# Patient Record
Sex: FEMALE | Race: BLACK OR AFRICAN AMERICAN | NOT HISPANIC OR LATINO
[De-identification: names, ages, dates, MRNs, and addresses within clinical notes are randomized per-mention and may not be internally consistent; named-entity substitution may affect disease eponyms.]

---

## 2019-06-10 ENCOUNTER — RECORD ABSTRACTING (OUTPATIENT)
Age: 44
End: 2019-06-10

## 2019-06-10 DIAGNOSIS — Z78.9 OTHER SPECIFIED HEALTH STATUS: ICD-10-CM

## 2019-06-10 DIAGNOSIS — Z83.3 FAMILY HISTORY OF DIABETES MELLITUS: ICD-10-CM

## 2019-06-12 ENCOUNTER — APPOINTMENT (OUTPATIENT)
Dept: FAMILY MEDICINE | Facility: CLINIC | Age: 44
End: 2019-06-12
Payer: COMMERCIAL

## 2019-06-12 ENCOUNTER — TRANSCRIPTION ENCOUNTER (OUTPATIENT)
Age: 44
End: 2019-06-12

## 2019-06-12 VITALS
SYSTOLIC BLOOD PRESSURE: 130 MMHG | BODY MASS INDEX: 33.18 KG/M2 | TEMPERATURE: 97.4 F | HEART RATE: 82 BPM | DIASTOLIC BLOOD PRESSURE: 82 MMHG | HEIGHT: 71 IN | OXYGEN SATURATION: 98 % | WEIGHT: 237 LBS

## 2019-06-12 DIAGNOSIS — M25.561 PAIN IN RIGHT KNEE: ICD-10-CM

## 2019-06-12 DIAGNOSIS — M25.562 PAIN IN RIGHT KNEE: ICD-10-CM

## 2019-06-12 PROCEDURE — 36415 COLL VENOUS BLD VENIPUNCTURE: CPT

## 2019-06-12 PROCEDURE — 99214 OFFICE O/P EST MOD 30 MIN: CPT | Mod: 25

## 2019-06-12 RX ORDER — MELOXICAM 15 MG/1
15 TABLET ORAL
Refills: 0 | Status: DISCONTINUED | COMMUNITY
End: 2019-06-12

## 2019-06-12 NOTE — REVIEW OF SYSTEMS
[Joint Pain] : joint pain [Joint Stiffness] : joint stiffness [Negative] : Heme/Lymph [FreeTextEntry9] : knee pain

## 2019-06-12 NOTE — PHYSICAL EXAM

## 2019-06-13 LAB
ANION GAP SERPL CALC-SCNC: 15 MMOL/L
BUN SERPL-MCNC: 10 MG/DL
CALCIUM SERPL-MCNC: 9.4 MG/DL
CHLORIDE SERPL-SCNC: 100 MMOL/L
CO2 SERPL-SCNC: 25 MMOL/L
CREAT SERPL-MCNC: 0.95 MG/DL
ERYTHROCYTE [SEDIMENTATION RATE] IN BLOOD BY WESTERGREN METHOD: 24 MM/HR
GLUCOSE SERPL-MCNC: 126 MG/DL
POTASSIUM SERPL-SCNC: 3.7 MMOL/L
RHEUMATOID FACT SER QL: <10 IU/ML
SODIUM SERPL-SCNC: 140 MMOL/L

## 2019-08-13 ENCOUNTER — APPOINTMENT (OUTPATIENT)
Dept: FAMILY MEDICINE | Facility: CLINIC | Age: 44
End: 2019-08-13
Payer: COMMERCIAL

## 2019-08-13 VITALS
WEIGHT: 231 LBS | HEART RATE: 70 BPM | DIASTOLIC BLOOD PRESSURE: 80 MMHG | SYSTOLIC BLOOD PRESSURE: 130 MMHG | HEIGHT: 71 IN | OXYGEN SATURATION: 99 % | BODY MASS INDEX: 32.34 KG/M2 | TEMPERATURE: 97.6 F

## 2019-08-13 PROCEDURE — 36415 COLL VENOUS BLD VENIPUNCTURE: CPT

## 2019-08-13 PROCEDURE — 99213 OFFICE O/P EST LOW 20 MIN: CPT | Mod: 25

## 2019-08-13 NOTE — PHYSICAL EXAM
[No Acute Distress] : no acute distress [Well Nourished] : well nourished [Well Developed] : well developed [Well-Appearing] : well-appearing [Normal Sclera/Conjunctiva] : normal sclera/conjunctiva [EOMI] : extraocular movements intact [PERRL] : pupils equal round and reactive to light [Normal Outer Ear/Nose] : the outer ears and nose were normal in appearance [No JVD] : no jugular venous distention [Normal Oropharynx] : the oropharynx was normal [No Lymphadenopathy] : no lymphadenopathy [Supple] : supple [No Respiratory Distress] : no respiratory distress  [No Accessory Muscle Use] : no accessory muscle use [Clear to Auscultation] : lungs were clear to auscultation bilaterally [Normal Rate] : normal rate  [Regular Rhythm] : with a regular rhythm [Normal S1, S2] : normal S1 and S2 [No Murmur] : no murmur heard [No Carotid Bruits] : no carotid bruits [No Varicosities] : no varicosities [No Abdominal Bruit] : a ~M bruit was not heard ~T in the abdomen [Pedal Pulses Present] : the pedal pulses are present [No Edema] : there was no peripheral edema [No Palpable Aorta] : no palpable aorta [No Extremity Clubbing/Cyanosis] : no extremity clubbing/cyanosis [Soft] : abdomen soft [Non-distended] : non-distended [Non Tender] : non-tender [No Masses] : no abdominal mass palpated [No HSM] : no HSM [Normal Bowel Sounds] : normal bowel sounds [Normal Posterior Cervical Nodes] : no posterior cervical lymphadenopathy [Normal Anterior Cervical Nodes] : no anterior cervical lymphadenopathy [No Spinal Tenderness] : no spinal tenderness [No CVA Tenderness] : no CVA  tenderness [Grossly Normal Strength/Tone] : grossly normal strength/tone [No Joint Swelling] : no joint swelling [Coordination Grossly Intact] : coordination grossly intact [No Rash] : no rash [No Focal Deficits] : no focal deficits [Normal Gait] : normal gait [Normal Affect] : the affect was normal [Deep Tendon Reflexes (DTR)] : deep tendon reflexes were 2+ and symmetric [Normal Insight/Judgement] : insight and judgment were intact [de-identified] : Questionable thyromegaly

## 2019-08-15 LAB
ANION GAP SERPL CALC-SCNC: 11 MMOL/L
BUN SERPL-MCNC: 14 MG/DL
CALCIUM SERPL-MCNC: 9.7 MG/DL
CHLORIDE SERPL-SCNC: 100 MMOL/L
CO2 SERPL-SCNC: 27 MMOL/L
CREAT SERPL-MCNC: 1.03 MG/DL
GLUCOSE SERPL-MCNC: 84 MG/DL
POTASSIUM SERPL-SCNC: 4 MMOL/L
SODIUM SERPL-SCNC: 138 MMOL/L
T4 FREE SERPL-MCNC: 1.3 NG/DL
TSH SERPL-ACNC: 1.83 UIU/ML

## 2019-11-27 ENCOUNTER — APPOINTMENT (OUTPATIENT)
Dept: FAMILY MEDICINE | Facility: CLINIC | Age: 44
End: 2019-11-27
Payer: COMMERCIAL

## 2019-11-27 VITALS
OXYGEN SATURATION: 98 % | WEIGHT: 237 LBS | BODY MASS INDEX: 33.18 KG/M2 | HEART RATE: 91 BPM | DIASTOLIC BLOOD PRESSURE: 80 MMHG | TEMPERATURE: 99.8 F | HEIGHT: 71 IN | SYSTOLIC BLOOD PRESSURE: 124 MMHG

## 2019-11-27 DIAGNOSIS — E87.6 HYPOKALEMIA: ICD-10-CM

## 2019-11-27 DIAGNOSIS — Z87.09 PERSONAL HISTORY OF OTHER DISEASES OF THE RESPIRATORY SYSTEM: ICD-10-CM

## 2019-11-27 PROCEDURE — 99213 OFFICE O/P EST LOW 20 MIN: CPT

## 2019-11-27 RX ORDER — ALBUTEROL SULFATE 90 UG/1
108 (90 BASE) AEROSOL, METERED RESPIRATORY (INHALATION)
Qty: 1 | Refills: 1 | Status: ACTIVE | COMMUNITY
Start: 2019-11-27 | End: 1900-01-01

## 2019-11-27 NOTE — PHYSICAL EXAM
[Normal Sclera/Conjunctiva] : normal sclera/conjunctiva [Normal Oropharynx] : the oropharynx was normal [Normal] : normal rate, regular rhythm, normal S1 and S2 and no murmur heard [Normal Supraclavicular Nodes] : no supraclavicular lymphadenopathy [Normal Posterior Cervical Nodes] : no posterior cervical lymphadenopathy [Normal Anterior Cervical Nodes] : no anterior cervical lymphadenopathy

## 2019-11-27 NOTE — HISTORY OF PRESENT ILLNESS
[Cold Symptoms] : cold symptoms [Moderate] : moderate [Cough] : cough [Chills] : chills [Fatigue] : fatigue [Fever] : fever [Worsening] : worsening [FreeTextEntry8] : 43-year-old female past medical history of hypertension. Here today complaining of congestion, cough, low grade temp x2 days. Positive history of asthma

## 2020-01-06 ENCOUNTER — RX RENEWAL (OUTPATIENT)
Age: 45
End: 2020-01-06

## 2020-06-17 ENCOUNTER — APPOINTMENT (OUTPATIENT)
Dept: FAMILY MEDICINE | Facility: CLINIC | Age: 45
End: 2020-06-17
Payer: COMMERCIAL

## 2020-06-17 VITALS
BODY MASS INDEX: 32.5 KG/M2 | TEMPERATURE: 97.8 F | HEART RATE: 73 BPM | RESPIRATION RATE: 12 BRPM | WEIGHT: 233 LBS | OXYGEN SATURATION: 98 %

## 2020-06-17 VITALS — SYSTOLIC BLOOD PRESSURE: 110 MMHG | HEART RATE: 68 BPM | DIASTOLIC BLOOD PRESSURE: 80 MMHG

## 2020-06-17 LAB
BILIRUB UR QL STRIP: NORMAL
CLARITY UR: CLEAR
COLLECTION METHOD: NORMAL
GLUCOSE UR-MCNC: NORMAL
HCG UR QL: 0.2 EU/DL
HGB UR QL STRIP.AUTO: NORMAL
KETONES UR-MCNC: NORMAL
LEUKOCYTE ESTERASE UR QL STRIP: NORMAL
NITRITE UR QL STRIP: NORMAL
PH UR STRIP: 7
PROT UR STRIP-MCNC: NORMAL
SP GR UR STRIP: 1.01

## 2020-06-17 PROCEDURE — 99213 OFFICE O/P EST LOW 20 MIN: CPT

## 2020-06-17 PROCEDURE — 81003 URINALYSIS AUTO W/O SCOPE: CPT | Mod: QW

## 2020-06-17 PROCEDURE — 36415 COLL VENOUS BLD VENIPUNCTURE: CPT

## 2020-06-17 NOTE — HISTORY OF PRESENT ILLNESS
[FreeTextEntry1] : Pt is here for BW and medications renewal. [de-identified] : pt feeling well no complaints

## 2020-06-17 NOTE — PHYSICAL EXAM
[No Acute Distress] : no acute distress [Well-Appearing] : well-appearing [Well Developed] : well developed [Well Nourished] : well nourished [PERRL] : pupils equal round and reactive to light [Normal Sclera/Conjunctiva] : normal sclera/conjunctiva [Normal Oropharynx] : the oropharynx was normal [EOMI] : extraocular movements intact [No JVD] : no jugular venous distention [Normal Outer Ear/Nose] : the outer ears and nose were normal in appearance [No Lymphadenopathy] : no lymphadenopathy [Thyroid Normal, No Nodules] : the thyroid was normal and there were no nodules present [Supple] : supple [No Respiratory Distress] : no respiratory distress  [No Accessory Muscle Use] : no accessory muscle use [Clear to Auscultation] : lungs were clear to auscultation bilaterally [Normal Rate] : normal rate  [Regular Rhythm] : with a regular rhythm [Normal S1, S2] : normal S1 and S2 [No Murmur] : no murmur heard [No Abdominal Bruit] : a ~M bruit was not heard ~T in the abdomen [No Carotid Bruits] : no carotid bruits [Pedal Pulses Present] : the pedal pulses are present [No Edema] : there was no peripheral edema [No Varicosities] : no varicosities [No Palpable Aorta] : no palpable aorta [Soft] : abdomen soft [No Extremity Clubbing/Cyanosis] : no extremity clubbing/cyanosis [Non Tender] : non-tender [Non-distended] : non-distended [No HSM] : no HSM [No Masses] : no abdominal mass palpated [Normal Bowel Sounds] : normal bowel sounds [Normal Posterior Cervical Nodes] : no posterior cervical lymphadenopathy [Normal Anterior Cervical Nodes] : no anterior cervical lymphadenopathy [No CVA Tenderness] : no CVA  tenderness [No Spinal Tenderness] : no spinal tenderness [No Joint Swelling] : no joint swelling [Grossly Normal Strength/Tone] : grossly normal strength/tone [No Rash] : no rash [Normal Gait] : normal gait [Coordination Grossly Intact] : coordination grossly intact [No Focal Deficits] : no focal deficits [Normal Affect] : the affect was normal [Deep Tendon Reflexes (DTR)] : deep tendon reflexes were 2+ and symmetric [Normal Insight/Judgement] : insight and judgment were intact

## 2020-06-18 LAB
ALBUMIN SERPL ELPH-MCNC: 4.3 G/DL
ALP BLD-CCNC: 60 U/L
ALT SERPL-CCNC: 29 U/L
ANION GAP SERPL CALC-SCNC: 14 MMOL/L
AST SERPL-CCNC: 27 U/L
BASOPHILS # BLD AUTO: 0.04 K/UL
BASOPHILS NFR BLD AUTO: 0.5 %
BILIRUB SERPL-MCNC: 0.4 MG/DL
BUN SERPL-MCNC: 11 MG/DL
CALCIUM SERPL-MCNC: 9.3 MG/DL
CHLORIDE SERPL-SCNC: 96 MMOL/L
CHOLEST SERPL-MCNC: 201 MG/DL
CHOLEST/HDLC SERPL: 5.4 RATIO
CO2 SERPL-SCNC: 28 MMOL/L
CREAT SERPL-MCNC: 1.02 MG/DL
EOSINOPHIL # BLD AUTO: 0.07 K/UL
EOSINOPHIL NFR BLD AUTO: 0.9 %
ESTIMATED AVERAGE GLUCOSE: 103 MG/DL
GLUCOSE SERPL-MCNC: 94 MG/DL
HBA1C MFR BLD HPLC: 5.2 %
HCT VFR BLD CALC: 43.6 %
HDLC SERPL-MCNC: 37 MG/DL
HGB BLD-MCNC: 14.6 G/DL
IMM GRANULOCYTES NFR BLD AUTO: 0.5 %
LDLC SERPL CALC-MCNC: 111 MG/DL
LYMPHOCYTES # BLD AUTO: 2.5 K/UL
LYMPHOCYTES NFR BLD AUTO: 30.9 %
MAN DIFF?: NORMAL
MCHC RBC-ENTMCNC: 32.6 PG
MCHC RBC-ENTMCNC: 33.5 GM/DL
MCV RBC AUTO: 97.3 FL
MONOCYTES # BLD AUTO: 0.65 K/UL
MONOCYTES NFR BLD AUTO: 8 %
NEUTROPHILS # BLD AUTO: 4.78 K/UL
NEUTROPHILS NFR BLD AUTO: 59.2 %
PLATELET # BLD AUTO: 280 K/UL
POTASSIUM SERPL-SCNC: 3.2 MMOL/L
PROT SERPL-MCNC: 7.6 G/DL
RBC # BLD: 4.48 M/UL
RBC # FLD: 12.1 %
SODIUM SERPL-SCNC: 139 MMOL/L
T4 FREE SERPL-MCNC: 1.2 NG/DL
TRIGL SERPL-MCNC: 266 MG/DL
TSH SERPL-ACNC: 1.67 UIU/ML
WBC # FLD AUTO: 8.08 K/UL

## 2020-12-21 PROBLEM — Z87.09 HISTORY OF ACUTE BRONCHITIS: Status: RESOLVED | Noted: 2019-11-27 | Resolved: 2020-12-21

## 2021-07-07 ENCOUNTER — RX RENEWAL (OUTPATIENT)
Age: 46
End: 2021-07-07

## 2021-07-14 ENCOUNTER — APPOINTMENT (OUTPATIENT)
Dept: FAMILY MEDICINE | Facility: CLINIC | Age: 46
End: 2021-07-14

## 2021-08-04 ENCOUNTER — APPOINTMENT (OUTPATIENT)
Dept: FAMILY MEDICINE | Facility: CLINIC | Age: 46
End: 2021-08-04
Payer: COMMERCIAL

## 2021-08-04 VITALS — DIASTOLIC BLOOD PRESSURE: 78 MMHG | SYSTOLIC BLOOD PRESSURE: 125 MMHG | HEART RATE: 76 BPM

## 2021-08-04 VITALS — OXYGEN SATURATION: 98 % | TEMPERATURE: 97.8 F | HEART RATE: 75 BPM

## 2021-08-04 DIAGNOSIS — J45.901 UNSPECIFIED ASTHMA WITH (ACUTE) EXACERBATION: ICD-10-CM

## 2021-08-04 DIAGNOSIS — E01.0 IODINE-DEFICIENCY RELATED DIFFUSE (ENDEMIC) GOITER: ICD-10-CM

## 2021-08-04 PROCEDURE — 99214 OFFICE O/P EST MOD 30 MIN: CPT | Mod: 25

## 2021-08-04 PROCEDURE — 81003 URINALYSIS AUTO W/O SCOPE: CPT | Mod: QW

## 2021-08-04 PROCEDURE — 36415 COLL VENOUS BLD VENIPUNCTURE: CPT

## 2021-08-06 LAB
ALBUMIN SERPL ELPH-MCNC: 4.4 G/DL
ALP BLD-CCNC: 62 U/L
ALT SERPL-CCNC: 26 U/L
ANION GAP SERPL CALC-SCNC: 15 MMOL/L
AST SERPL-CCNC: 25 U/L
BASOPHILS # BLD AUTO: 0.05 K/UL
BASOPHILS NFR BLD AUTO: 1.2 %
BILIRUB SERPL-MCNC: 0.2 MG/DL
BILIRUB UR QL STRIP: NORMAL
BUN SERPL-MCNC: 12 MG/DL
CALCIUM SERPL-MCNC: 9.1 MG/DL
CHLORIDE SERPL-SCNC: 101 MMOL/L
CHOLEST SERPL-MCNC: 190 MG/DL
CO2 SERPL-SCNC: 21 MMOL/L
CREAT SERPL-MCNC: 1.03 MG/DL
EOSINOPHIL # BLD AUTO: 0.04 K/UL
EOSINOPHIL NFR BLD AUTO: 0.9 %
ESTIMATED AVERAGE GLUCOSE: 97 MG/DL
GLUCOSE SERPL-MCNC: 82 MG/DL
GLUCOSE UR-MCNC: NORMAL
HBA1C MFR BLD HPLC: 5 %
HCG UR QL: 0.2 EU/DL
HCT VFR BLD CALC: 43.1 %
HDLC SERPL-MCNC: 41 MG/DL
HGB BLD-MCNC: 14.5 G/DL
HGB UR QL STRIP.AUTO: NORMAL
IMM GRANULOCYTES NFR BLD AUTO: 0.2 %
KETONES UR-MCNC: NORMAL
LDLC SERPL CALC-MCNC: 120 MG/DL
LEUKOCYTE ESTERASE UR QL STRIP: NORMAL
LYMPHOCYTES # BLD AUTO: 1.48 K/UL
LYMPHOCYTES NFR BLD AUTO: 34.1 %
MAN DIFF?: NORMAL
MCHC RBC-ENTMCNC: 33.2 PG
MCHC RBC-ENTMCNC: 33.6 GM/DL
MCV RBC AUTO: 98.6 FL
MONOCYTES # BLD AUTO: 0.62 K/UL
MONOCYTES NFR BLD AUTO: 14.3 %
NEUTROPHILS # BLD AUTO: 2.14 K/UL
NEUTROPHILS NFR BLD AUTO: 49.3 %
NITRITE UR QL STRIP: NORMAL
NONHDLC SERPL-MCNC: 148 MG/DL
PH UR STRIP: 6
PLATELET # BLD AUTO: 224 K/UL
POTASSIUM SERPL-SCNC: 3.8 MMOL/L
PROT SERPL-MCNC: 7.4 G/DL
PROT UR STRIP-MCNC: NORMAL
RBC # BLD: 4.37 M/UL
RBC # FLD: 12.6 %
SODIUM SERPL-SCNC: 137 MMOL/L
SP GR UR STRIP: 1.02
T4 FREE SERPL-MCNC: 1.2 NG/DL
TRIGL SERPL-MCNC: 139 MG/DL
TSH SERPL-ACNC: 2.04 UIU/ML
WBC # FLD AUTO: 4.34 K/UL

## 2021-09-13 ENCOUNTER — APPOINTMENT (OUTPATIENT)
Dept: FAMILY MEDICINE | Facility: CLINIC | Age: 46
End: 2021-09-13
Payer: COMMERCIAL

## 2021-09-13 VITALS — HEART RATE: 71 BPM | OXYGEN SATURATION: 98 % | TEMPERATURE: 97 F

## 2021-09-13 PROCEDURE — 99213 OFFICE O/P EST LOW 20 MIN: CPT

## 2022-08-01 ENCOUNTER — APPOINTMENT (OUTPATIENT)
Dept: ORTHOPEDIC SURGERY | Facility: CLINIC | Age: 47
End: 2022-08-01

## 2022-08-01 ENCOUNTER — RX RENEWAL (OUTPATIENT)
Age: 47
End: 2022-08-01

## 2022-08-01 VITALS — BODY MASS INDEX: 29.4 KG/M2 | HEIGHT: 71 IN | WEIGHT: 210 LBS

## 2022-08-01 DIAGNOSIS — I10 ESSENTIAL (PRIMARY) HYPERTENSION: ICD-10-CM

## 2022-08-01 PROCEDURE — 20611 DRAIN/INJ JOINT/BURSA W/US: CPT | Mod: LT

## 2022-08-01 PROCEDURE — 99213 OFFICE O/P EST LOW 20 MIN: CPT | Mod: 25

## 2022-08-01 PROCEDURE — J3490M: CUSTOM

## 2022-08-01 NOTE — ASSESSMENT
[FreeTextEntry1] : ACUTE EXACERBATION OF LEFT KNEE PAIN, GOOD RELIEF WITH CSI PRIOR\par REPEAT CSI DISCUSSED AND DONE TODAY, TOLERATED WELL\par DISCUSSED SCOPE IF PAIN PERSISTS

## 2022-08-01 NOTE — IMAGING
[de-identified] : LEFT KNEE\par 1. No effusion, no warmth, no ecchymosis, no erythema.\par 2. MEDIAL JOINT TTP\par 3. Range of motion 0-140 without pain\par 4. 5/5 quadriceps and hamstring strength\par 5. Negative Lachman, negative [place], negative anterior drawer, negative posterior drawer, negative patella apprehension; no extensor lag: no varus or valgus instability.\par 6. Motor and sensory intact distally\par 7. Negative Renetta\par 8. Non-antalgic gait\par

## 2022-08-01 NOTE — HISTORY OF PRESENT ILLNESS
[Gradual] : gradual [9] : 9 [7] : 7 [Dull/Aching] : dull/aching [Localized] : localized [Sharp] : sharp [Throbbing] : throbbing [Intermittent] : intermittent [Household chores] : household chores [Injection therapy] : injection therapy [Walking] : walking [Stairs] : stairs [de-identified] : 8/27/21: 45 YEAR OLD FEMALE PRESENTS WITH B/L KNEES FOR ABOUT A YEAR OR SO. SHE HAS\par TRIED MELOXICAM WITHOUT RELIEF. LEFT IS WORSE THAN THE RIGHT. PATIENT HAS MRI OF THE LEFT KNEE. PAIN\par BEGAN ON ITS OWN. PAIN IS WORSE WITH SITTING FOR LONG PERIODS OF TIME. \par DENIES INSTABILITY, CATCHING/LOCKING\par 8/1/22: FOLLOW UP LEFT KNEE. PAIN IS RETURNING, LEAVING FOR [facility] IN 2 DAYS. NO MECHANICAL SYMPTOMS. GOOD RELIEF WITH PRIOR CSI 12/2021. [] : no [FreeTextEntry1] : left knee  [FreeTextEntry5] : no injury  [de-identified] : cortisone injection

## 2022-08-01 NOTE — PROCEDURE
[FreeTextEntry3] : Large Joint Injection was performed because of pain and inflammation. Anesthesia: ethyl chloride sprayed topically.. \par Celestone: 2 cc. \par Lidocaine: 3 cc. \par Marcaine: 3 cc. \par \par Medication was injected in the LEFT KNEE. Patient has tried OTC's including aspirin, Ibuprofen, Aleve etc or prescription NSAIDS, and/or exercises at home and/ or physical therapy without satisfactory response and Patient has decreased mobility in the joint. The risks, benefits, and alternatives to cortisone injection were explained in full to the patient. Risks outlined include but are not limited to infection, sepsis, bleeding, scarring, skin discoloration, temporary increase in pain, syncopal episode, failure to resolve symptoms, allergic reaction, symptom recurrence, and elevation of blood sugar in diabetics. Patient understood the risks. All questions were answered. After discussion of options, patient requested an injection. Oral informed consent was obtained and sterile prep was done of the injection site with betadyne and alcohol. Sterile technique was utilized for the procedure including the preparation of the solutions used for the injection. Patient tolerated the procedure well. Advised to ice the injection site this evening. Post Procedure Instructions: Patient was advised to call if redness, pain, or fever occur and apply ice for 15 min. out of every hour for the next 12-24 hours as tolerated. patient was advised to rest the joint(s) for 2 days. \par \par Ultrasound guidance was indicated for this patient due to difficult injection. All ultrasound images have been permanently captured and stored accordingly in our picture archiving and communication system. Visualization of the needle and placement of injection was performed without complication.\par

## 2022-09-09 ENCOUNTER — APPOINTMENT (OUTPATIENT)
Dept: FAMILY MEDICINE | Facility: CLINIC | Age: 47
End: 2022-09-09

## 2022-09-09 VITALS
SYSTOLIC BLOOD PRESSURE: 112 MMHG | DIASTOLIC BLOOD PRESSURE: 62 MMHG | HEART RATE: 64 BPM | TEMPERATURE: 97.7 F | BODY MASS INDEX: 32.93 KG/M2 | HEIGHT: 71 IN | WEIGHT: 235.2 LBS | OXYGEN SATURATION: 97 %

## 2022-09-09 DIAGNOSIS — Z82.49 FAMILY HISTORY OF ISCHEMIC HEART DISEASE AND OTHER DISEASES OF THE CIRCULATORY SYSTEM: ICD-10-CM

## 2022-09-09 DIAGNOSIS — Z13.220 ENCOUNTER FOR SCREENING FOR LIPOID DISORDERS: ICD-10-CM

## 2022-09-09 DIAGNOSIS — Z13.1 ENCOUNTER FOR SCREENING FOR DIABETES MELLITUS: ICD-10-CM

## 2022-09-09 DIAGNOSIS — F32.A DEPRESSION, UNSPECIFIED: ICD-10-CM

## 2022-09-09 PROCEDURE — 99396 PREV VISIT EST AGE 40-64: CPT | Mod: 25

## 2022-09-09 PROCEDURE — 36415 COLL VENOUS BLD VENIPUNCTURE: CPT

## 2022-09-09 RX ORDER — POTASSIUM CHLORIDE 1500 MG/1
20 TABLET, FILM COATED, EXTENDED RELEASE ORAL
Qty: 90 | Refills: 0 | Status: DISCONTINUED | COMMUNITY
End: 2022-09-09

## 2022-09-09 RX ORDER — HYDROCHLOROTHIAZIDE 25 MG/1
25 TABLET ORAL
Qty: 90 | Refills: 3 | Status: DISCONTINUED | COMMUNITY
End: 2022-09-09

## 2022-09-09 RX ORDER — MELOXICAM 7.5 MG/1
7.5 TABLET ORAL TWICE DAILY
Qty: 180 | Refills: 0 | Status: DISCONTINUED | COMMUNITY
Start: 2019-06-12 | End: 2022-09-09

## 2022-09-09 RX ORDER — MONTELUKAST 10 MG/1
10 TABLET, FILM COATED ORAL
Qty: 90 | Refills: 3 | Status: DISCONTINUED | COMMUNITY
Start: 2019-11-27 | End: 2022-09-09

## 2022-09-09 RX ORDER — AZITHROMYCIN 250 MG/1
250 TABLET, FILM COATED ORAL
Qty: 1 | Refills: 0 | Status: DISCONTINUED | COMMUNITY
Start: 2019-11-27 | End: 2022-09-09

## 2022-09-09 NOTE — HISTORY OF PRESENT ILLNESS
[FreeTextEntry1] : Patient is here for CPE  [de-identified] : 45 y/o female with pmhx of HTN, asthma presents for CPE. States she has been having left knee pain after a torn meniscus. She has gotten 1 cortisone injection this year and one last year with relief of her pain.

## 2022-09-09 NOTE — REVIEW OF SYSTEMS
[Depression] : depression [Fever] : no fever [Chills] : no chills [Pain] : no pain [Itching] : no itching [Nasal Discharge] : no nasal discharge [Sore Throat] : no sore throat [Chest Pain] : no chest pain [Palpitations] : no palpitations [Lower Ext Edema] : no lower extremity edema [Shortness Of Breath] : no shortness of breath [Wheezing] : no wheezing [Cough] : no cough [Abdominal Pain] : no abdominal pain [Nausea] : no nausea [Diarrhea] : diarrhea [Vomiting] : no vomiting [Dysuria] : no dysuria [Hematuria] : no hematuria [Skin Rash] : no skin rash [Headache] : no headache [Dizziness] : no dizziness [Confusion] : no confusion [Anxiety] : no anxiety

## 2022-09-09 NOTE — ASSESSMENT
[FreeTextEntry1] : CPE:\par -will order CBC w/ diff, CMP, Lipid, TSH and HgbA1c, labs to be drawn in office\par \par Screening:\par -Breast cancer screening: due for mamogram, to be ordered by her gyn this month\par -Cervical Cancer Screening: last in 9/2021, up to date\par -Colon Cancer screening: screening recommended\par \par EKG: The USPSTF recommends against screening with resting or exercise electrocardiography (ECG) to prevent cardiovascular disease (CVD) events in asymptomatic adults at low risk of CVD events (10-year CVD event risk <10%)\par \par Vaccinations:\par Seasonal flu  - recommended\par \par -previous notes and labs reviewed, consultants notes reviewed\par -patient expressed understanding of plan, all questions answered\par -follow up in 1 year for annual CPE\par \par Mild depression\par Phq2 depression screen positive, phq9 positive with score of 9, mild depression\par patient not interested in trying medications at this time but is interested in counseling\par No suicidal ideation\par Will refer to behavioral care team, verbal consent obtained

## 2022-09-09 NOTE — HEALTH RISK ASSESSMENT
[Never] : Never [Yes] : Yes [2 - 3 times a week (3 pts)] : 2 - 3  times a week (3 points) [1 or 2 (0 pts)] : 1 or 2 (0 points) [Never (0 pts)] : Never (0 points) [No] : In the past 12 months have you used drugs other than those required for medical reasons? No [1] : 2) Feeling down, depressed, or hopeless for several days (1) [1/2 of Days or More (2)] : 3.) Trouble falling asleep, or sleeping too much? Half the days or more [Nearly Every Day (3)] : 4.) Feeling tired or having little energy? Nearly every day [Several Days (1)] : 7.) Trouble concentrating on things, such as reading a newspaper or watching television? Several days [Not at All (0)] : 8.) Moving or speaking so slowly that other people could have noticed, or the opposite, moving or speaking faster than usual? Not at all [Mild] : severity of depression is mild [Somewhat Difficult] : How difficult have these problems made it for you to do your work, take care of things at home, or get along with people? Somewhat difficult [I have developed a follow-up plan documented below in the note.] : I have developed a follow-up plan documented below in the note. [Patient reported mammogram was normal] : Patient reported mammogram was normal [Patient reported PAP Smear was normal] : Patient reported PAP Smear was normal [With Family] : lives with family [Employed] : employed [# Of Children ___] : has [unfilled] children [Audit-CScore] : 3 [[id number]] : 2 [[id number]] : 9 [MammogramDate] : 10/2021 [PapSmearDate] : 09/2021 [PapSmearComments] : Dr. Hemanth Mina

## 2022-09-12 ENCOUNTER — NON-APPOINTMENT (OUTPATIENT)
Age: 47
End: 2022-09-12

## 2022-09-12 LAB
ALBUMIN SERPL ELPH-MCNC: 4.6 G/DL
ALP BLD-CCNC: 72 U/L
ALT SERPL-CCNC: 18 U/L
ANION GAP SERPL CALC-SCNC: 14 MMOL/L
AST SERPL-CCNC: 20 U/L
BASOPHILS # BLD AUTO: 0.05 K/UL
BASOPHILS NFR BLD AUTO: 0.8 %
BILIRUB SERPL-MCNC: 0.3 MG/DL
BUN SERPL-MCNC: 11 MG/DL
CALCIUM SERPL-MCNC: 9.9 MG/DL
CHLORIDE SERPL-SCNC: 98 MMOL/L
CHOLEST SERPL-MCNC: 216 MG/DL
CO2 SERPL-SCNC: 27 MMOL/L
CREAT SERPL-MCNC: 1 MG/DL
EGFR: 71 ML/MIN/1.73M2
EOSINOPHIL # BLD AUTO: 0.04 K/UL
EOSINOPHIL NFR BLD AUTO: 0.6 %
ESTIMATED AVERAGE GLUCOSE: 108 MG/DL
GLUCOSE SERPL-MCNC: 89 MG/DL
HBA1C MFR BLD HPLC: 5.4 %
HCT VFR BLD CALC: 42.4 %
HDLC SERPL-MCNC: 43 MG/DL
HGB BLD-MCNC: 14.8 G/DL
IMM GRANULOCYTES NFR BLD AUTO: 0.5 %
LDLC SERPL CALC-MCNC: 139 MG/DL
LYMPHOCYTES # BLD AUTO: 2.25 K/UL
LYMPHOCYTES NFR BLD AUTO: 33.8 %
MAN DIFF?: NORMAL
MCHC RBC-ENTMCNC: 33.1 PG
MCHC RBC-ENTMCNC: 34.9 GM/DL
MCV RBC AUTO: 94.9 FL
MONOCYTES # BLD AUTO: 0.74 K/UL
MONOCYTES NFR BLD AUTO: 11.1 %
NEUTROPHILS # BLD AUTO: 3.55 K/UL
NEUTROPHILS NFR BLD AUTO: 53.2 %
NONHDLC SERPL-MCNC: 173 MG/DL
PLATELET # BLD AUTO: 269 K/UL
POTASSIUM SERPL-SCNC: 3.8 MMOL/L
PROT SERPL-MCNC: 7.5 G/DL
RBC # BLD: 4.47 M/UL
RBC # FLD: 12.8 %
SODIUM SERPL-SCNC: 139 MMOL/L
T4 FREE SERPL-MCNC: 1.2 NG/DL
TRIGL SERPL-MCNC: 171 MG/DL
TSH SERPL-ACNC: 1.48 UIU/ML
WBC # FLD AUTO: 6.66 K/UL

## 2022-12-07 ENCOUNTER — RX RENEWAL (OUTPATIENT)
Age: 47
End: 2022-12-07

## 2022-12-30 ENCOUNTER — NON-APPOINTMENT (OUTPATIENT)
Age: 47
End: 2022-12-30

## 2022-12-30 ENCOUNTER — APPOINTMENT (OUTPATIENT)
Dept: FAMILY MEDICINE | Facility: CLINIC | Age: 47
End: 2022-12-30
Payer: COMMERCIAL

## 2022-12-30 VITALS
SYSTOLIC BLOOD PRESSURE: 124 MMHG | DIASTOLIC BLOOD PRESSURE: 82 MMHG | OXYGEN SATURATION: 97 % | HEART RATE: 7 BPM | TEMPERATURE: 97.1 F

## 2022-12-30 DIAGNOSIS — H92.02 OTALGIA, LEFT EAR: ICD-10-CM

## 2022-12-30 PROCEDURE — 99213 OFFICE O/P EST LOW 20 MIN: CPT

## 2022-12-30 RX ORDER — HYDROCORTISONE AND ACETIC ACID OTIC 20.75; 10.375 MG/ML; MG/ML
1-2 SOLUTION AURICULAR (OTIC) 4 TIMES DAILY
Qty: 1 | Refills: 1 | Status: ACTIVE | COMMUNITY
Start: 2022-12-30 | End: 1900-01-01

## 2022-12-30 NOTE — REVIEW OF SYSTEMS
[Fever] : no fever [Chills] : no chills [Earache] : earache [Sore Throat] : no sore throat [Postnasal Drip] : postnasal drip [Negative] : Respiratory

## 2022-12-30 NOTE — HISTORY OF PRESENT ILLNESS
[FreeTextEntry8] : Lico presents today for acute visit with chief complaint of left ear pain. She reports onset 1.5 weeks ago. Describes the pain as piercing in nature. She has not had any rhinorrhea, fever, sore throat, congestion. She tried OTC ear drops for swimmer's ear and heard a pop but continues to feel the pain. She states things sound more muffled than usual.

## 2022-12-30 NOTE — ASSESSMENT
[FreeTextEntry1] : No clear evidence of otitis media, likely secondary to effusion and post nasal drip. Advised nasal saline irrigation and Flonase. Will rx otic hydrocortisone. She can also try antihisatmines. If no relief, consider audiology testing or referral to ENT.

## 2022-12-30 NOTE — PHYSICAL EXAM
[No Acute Distress] : no acute distress [Well Nourished] : well nourished [Well Developed] : well developed [PERRL] : pupils equal round and reactive to light [EOMI] : extraocular movements intact [Normal Outer Ear/Nose] : the outer ears and nose were normal in appearance [Normal Oropharynx] : the oropharynx was normal [Normal] : normal rate, regular rhythm, normal S1 and S2 and no murmur heard [de-identified] : boggy nasal turbinates bilaterally, TMs intact bilaterally, no cerumen, no erythema

## 2023-04-28 ENCOUNTER — APPOINTMENT (OUTPATIENT)
Dept: ORTHOPEDIC SURGERY | Facility: CLINIC | Age: 48
End: 2023-04-28
Payer: COMMERCIAL

## 2023-04-28 VITALS — BODY MASS INDEX: 30.1 KG/M2 | HEIGHT: 71 IN | WEIGHT: 215 LBS

## 2023-04-28 DIAGNOSIS — S83.232D COMPLEX TEAR OF MEDIAL MENISCUS, CURRENT INJURY, LEFT KNEE, SUBSEQUENT ENCOUNTER: ICD-10-CM

## 2023-04-28 PROCEDURE — J3490M: CUSTOM | Mod: LT

## 2023-04-28 PROCEDURE — 99213 OFFICE O/P EST LOW 20 MIN: CPT | Mod: 25

## 2023-04-28 PROCEDURE — 20611 DRAIN/INJ JOINT/BURSA W/US: CPT | Mod: LT

## 2023-04-28 NOTE — HISTORY OF PRESENT ILLNESS
[Gradual] : gradual [9] : 9 [7] : 7 [Dull/Aching] : dull/aching [Localized] : localized [Sharp] : sharp [Throbbing] : throbbing [Intermittent] : intermittent [Household chores] : household chores [Injection therapy] : injection therapy [Walking] : walking [Stairs] : stairs [de-identified] : 8/27/21: 45 YEAR OLD FEMALE PRESENTS WITH B/L KNEES FOR ABOUT A YEAR OR SO. SHE HAS\par TRIED MELOXICAM WITHOUT RELIEF. LEFT IS WORSE THAN THE RIGHT. PATIENT HAS MRI OF THE LEFT KNEE. PAIN\par BEGAN ON ITS OWN. PAIN IS WORSE WITH SITTING FOR LONG PERIODS OF TIME. \par DENIES INSTABILITY, CATCHING/LOCKING\par 8/1/22: FOLLOW UP LEFT KNEE. PAIN IS RETURNING, LEAVING FOR [facility] IN 2 DAYS. NO MECHANICAL SYMPTOMS. GOOD RELIEF WITH PRIOR CSI 12/2021.\par \par \par 04/28/2023 HERE FOR A FOLLOW UP HAD CSI BACK IN AUGUST 2022 WITH SOME RELIEF, HAVING THROBBING PAIN  [] : no [FreeTextEntry1] : left knee  [FreeTextEntry5] : no injury  [de-identified] : cortisone injection

## 2023-04-28 NOTE — PROCEDURE
[FreeTextEntry3] : - Large joint injection was performed of the  left knee. \par - The indication for this procedure was pain and inflammation. Patient has tried OTC's including aspirin, Ibuprofen, Aleve, etc or prescription NSAIDS, and/or exercises at home and/or physical therapy without satisfactory response, patient had decreased mobility in the joint and the risks benefits, and alternatives have been discussed, and verbal consent was obtained. The site was prepped with alcohol, betadine, ethyl chloride sprayed topically and sterile technique used. \par - An injection of Betamethasone 2cc; Marcaine 5cc injected.\par - Patient was advised to call if redness, pain or fever occur, apply ice for 15 minutes out of every hour for the next 12-24 hours as tolerated and patient was advised to rest the joint(s) for 2 days. \par - Ultrasound guidance was indicated for this patient due to prior failure or difficult injection. All ultrasound images have been permanently captured and stored accordingly in our picture archiving and communication system. Visualization of the needle and placement of injection was performed without complication.\par

## 2023-04-28 NOTE — IMAGING
[de-identified] : Mild effusion, no warmth, no ecchymosis\par Lateral joint line tenderness to palpation\par Range of motion 0-130\par 5/5 quadriceps and hamstring strength\par Negative Gregory\par No varus or valgus instability, negative lachman\par Motor and sensory intact distally\par Non antalgic gait\par

## 2023-05-10 ENCOUNTER — RX RENEWAL (OUTPATIENT)
Age: 48
End: 2023-05-10

## 2023-05-17 ENCOUNTER — RX RENEWAL (OUTPATIENT)
Age: 48
End: 2023-05-17

## 2023-08-07 ENCOUNTER — APPOINTMENT (OUTPATIENT)
Dept: ORTHOPEDIC SURGERY | Facility: CLINIC | Age: 48
End: 2023-08-07
Payer: COMMERCIAL

## 2023-08-07 DIAGNOSIS — E66.9 OBESITY, UNSPECIFIED: ICD-10-CM

## 2023-08-07 PROCEDURE — 20611 DRAIN/INJ JOINT/BURSA W/US: CPT | Mod: LT

## 2023-08-07 PROCEDURE — 99213 OFFICE O/P EST LOW 20 MIN: CPT | Mod: 25

## 2023-08-07 NOTE — HISTORY OF PRESENT ILLNESS
[Gradual] : gradual [9] : 9 [7] : 7 [Dull/Aching] : dull/aching [Localized] : localized [Sharp] : sharp [Throbbing] : throbbing [Intermittent] : intermittent [Household chores] : household chores [Injection therapy] : injection therapy [Walking] : walking [Stairs] : stairs [de-identified] : 8/27/21: 45 YEAR OLD FEMALE PRESENTS WITH B/L KNEES FOR ABOUT A YEAR OR SO. SHE HAS TRIED MELOXICAM WITHOUT RELIEF. LEFT IS WORSE THAN THE RIGHT. PATIENT HAS MRI OF THE LEFT KNEE. PAIN BEGAN ON ITS OWN. PAIN IS WORSE WITH SITTING FOR LONG PERIODS OF TIME.  DENIES INSTABILITY, CATCHING/LOCKING 8/1/22: FOLLOW UP LEFT KNEE. PAIN IS RETURNING, LEAVING FOR [facility] IN 2 DAYS. NO MECHANICAL SYMPTOMS. GOOD RELIEF WITH PRIOR CSI 12/2021.   04/28/2023 HERE FOR A FOLLOW UP HAD CSI BACK IN AUGUST 2022 WITH SOME RELIEF, HAVING THROBBING PAIN   08/07/23 here for a follow up left knee  had csi last visit with some relief  [] : no [FreeTextEntry1] : left knee  [FreeTextEntry5] : no injury  [de-identified] : cortisone injection

## 2023-08-07 NOTE — ASSESSMENT
[FreeTextEntry1] : Worsening lateral joint pain and mechanical symptoms for the last 2 weeks.  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  Corticosteroid injection back in April 40 relief but given only 4 months of relief and worsening mechanical symptoms I advised MRI to evaluate for worsening lateral meniscal tear.  Discussed arthroscopic partial lateral meniscectomy pending MRI results.  Would also consider viscosupplementation as an alternative to too much corticosteroid in the knee.  We will obtain off for next visit again pending MRI results.

## 2023-08-07 NOTE — IMAGING
[de-identified] : Mild effusion, no warmth, no ecchymosis Lateral joint line tenderness to palpation Range of motion 0-130 5/5 quadriceps and hamstring strength Positive Gregory No varus or valgus instability, negative lachman Motor and sensory intact distally Mildly antalgic gait

## 2023-08-08 ENCOUNTER — APPOINTMENT (OUTPATIENT)
Dept: MRI IMAGING | Facility: CLINIC | Age: 48
End: 2023-08-08
Payer: COMMERCIAL

## 2023-08-08 PROCEDURE — 73721 MRI JNT OF LWR EXTRE W/O DYE: CPT | Mod: LT

## 2023-08-14 ENCOUNTER — RX RENEWAL (OUTPATIENT)
Age: 48
End: 2023-08-14

## 2023-08-18 ENCOUNTER — APPOINTMENT (OUTPATIENT)
Dept: ORTHOPEDIC SURGERY | Facility: CLINIC | Age: 48
End: 2023-08-18
Payer: COMMERCIAL

## 2023-08-18 VITALS — WEIGHT: 215 LBS | BODY MASS INDEX: 30.1 KG/M2 | HEIGHT: 71 IN

## 2023-08-18 PROCEDURE — 99214 OFFICE O/P EST MOD 30 MIN: CPT | Mod: 57

## 2023-08-18 NOTE — IMAGING
[de-identified] : Mild effusion, no warmth, no ecchymosis Lateral joint line tenderness to palpation Range of motion 0-130 5/5 quadriceps and hamstring strength Positive Gregory No varus or valgus instability, negative lachman Motor and sensory intact distally Mildly antalgic gait

## 2023-08-18 NOTE — ASSESSMENT
[FreeTextEntry1] : I reviewed the MRI in detail.  She has worsening lateral meniscal tear with fairly large parameniscal cyst which has doubled in size in the last 2 years.  She had minimal relief with the corticosteroid injection therefore we will proceed with the arthroscopic partial lateral meniscectomy open excision of large lateral parameniscal cyst.  Risk benefits and alternatives reviewed postoperative course outlined.  She wants to proceed  The risks and benefits of surgery have been discussed. Risks include but are not limited to bleeding, infection, reaction to anesthesia, injury to blood vessels and nerves, malunion, nonunion, DVT, PE, necessity of repeat surgery, chronic pain, loss of limb and death. The patient understands the risks and agrees with the surgical plan. All questions have been answered.

## 2023-08-18 NOTE — HISTORY OF PRESENT ILLNESS
[Gradual] : gradual [Dull/Aching] : dull/aching [Localized] : localized [Sharp] : sharp [Throbbing] : throbbing [Intermittent] : intermittent [Household chores] : household chores [Injection therapy] : injection therapy [Walking] : walking [Stairs] : stairs [6] : 6 [3] : 3 [de-identified] : 8/27/21: 45 YEAR OLD FEMALE PRESENTS WITH B/L KNEES FOR ABOUT A YEAR OR SO. SHE HAS TRIED MELOXICAM WITHOUT RELIEF. LEFT IS WORSE THAN THE RIGHT. PATIENT HAS MRI OF THE LEFT KNEE. PAIN BEGAN ON ITS OWN. PAIN IS WORSE WITH SITTING FOR LONG PERIODS OF TIME.  DENIES INSTABILITY, CATCHING/LOCKING 8/1/22: FOLLOW UP LEFT KNEE. PAIN IS RETURNING, LEAVING FOR [facility] IN 2 DAYS. NO MECHANICAL SYMPTOMS. GOOD RELIEF WITH PRIOR CSI 12/2021.   04/28/2023 HERE FOR A FOLLOW UP HAD CSI BACK IN AUGUST 2022 WITH SOME RELIEF, HAVING THROBBING PAIN   08/07/23 here for a follow up left knee  had csi last visit with some relief   08/18/2023: FOLLOW UP FOR MRI RESULTS OF THE L KNEE DONE ON 8/8. REPORTS MILD RELIEF W/ CSI GIVEN LAST VISIT.  [] : Post Surgical Visit: no [FreeTextEntry1] : left knee  [FreeTextEntry5] : no injury  [de-identified] : cortisone injection

## 2023-08-24 ENCOUNTER — RX RENEWAL (OUTPATIENT)
Age: 48
End: 2023-08-24

## 2023-08-24 RX ORDER — FLUTICASONE PROPIONATE 50 UG/1
50 SPRAY, METERED NASAL DAILY
Qty: 16 | Refills: 2 | Status: ACTIVE | COMMUNITY
Start: 2022-12-30 | End: 1900-01-01

## 2023-11-20 ENCOUNTER — APPOINTMENT (OUTPATIENT)
Dept: FAMILY MEDICINE | Facility: CLINIC | Age: 48
End: 2023-11-20
Payer: COMMERCIAL

## 2023-11-20 VITALS
DIASTOLIC BLOOD PRESSURE: 85 MMHG | BODY MASS INDEX: 32.9 KG/M2 | SYSTOLIC BLOOD PRESSURE: 138 MMHG | HEIGHT: 71 IN | HEART RATE: 72 BPM | OXYGEN SATURATION: 97 % | WEIGHT: 235 LBS

## 2023-11-20 DIAGNOSIS — Z00.00 ENCOUNTER FOR GENERAL ADULT MEDICAL EXAMINATION W/OUT ABNORMAL FINDINGS: ICD-10-CM

## 2023-11-20 DIAGNOSIS — G47.19 OTHER HYPERSOMNIA: ICD-10-CM

## 2023-11-20 PROCEDURE — 36415 COLL VENOUS BLD VENIPUNCTURE: CPT

## 2023-11-20 PROCEDURE — G0444 DEPRESSION SCREEN ANNUAL: CPT | Mod: 59

## 2023-11-20 PROCEDURE — 99396 PREV VISIT EST AGE 40-64: CPT | Mod: 25

## 2023-11-20 RX ORDER — METOPROLOL SUCCINATE 50 MG/1
50 TABLET, EXTENDED RELEASE ORAL
Qty: 90 | Refills: 2 | Status: ACTIVE | COMMUNITY
Start: 2021-07-07 | End: 1900-01-01

## 2023-11-20 RX ORDER — HYDROCHLOROTHIAZIDE 12.5 MG/1
12.5 TABLET ORAL
Qty: 90 | Refills: 1 | Status: ACTIVE | COMMUNITY
Start: 2021-09-13 | End: 1900-01-01

## 2023-11-28 LAB
ALBUMIN SERPL ELPH-MCNC: 4.3 G/DL
ALP BLD-CCNC: 70 U/L
ALT SERPL-CCNC: 19 U/L
ANION GAP SERPL CALC-SCNC: 12 MMOL/L
AST SERPL-CCNC: 20 U/L
BILIRUB SERPL-MCNC: 0.6 MG/DL
BUN SERPL-MCNC: 13 MG/DL
CALCIUM SERPL-MCNC: 9.4 MG/DL
CHLORIDE SERPL-SCNC: 99 MMOL/L
CHOLEST SERPL-MCNC: 211 MG/DL
CO2 SERPL-SCNC: 27 MMOL/L
CREAT SERPL-MCNC: 1 MG/DL
EGFR: 70 ML/MIN/1.73M2
ESTIMATED AVERAGE GLUCOSE: 100 MG/DL
GLUCOSE SERPL-MCNC: 79 MG/DL
HBA1C MFR BLD HPLC: 5.1 %
HCT VFR BLD CALC: 43.4 %
HDLC SERPL-MCNC: 43 MG/DL
HGB BLD-MCNC: 14.3 G/DL
LDLC SERPL CALC-MCNC: 137 MG/DL
MCHC RBC-ENTMCNC: 31.8 PG
MCHC RBC-ENTMCNC: 32.9 GM/DL
MCV RBC AUTO: 96.7 FL
NONHDLC SERPL-MCNC: 168 MG/DL
PLATELET # BLD AUTO: 239 K/UL
POTASSIUM SERPL-SCNC: 3.9 MMOL/L
PROT SERPL-MCNC: 7.4 G/DL
RBC # BLD: 4.49 M/UL
RBC # FLD: 12.7 %
SODIUM SERPL-SCNC: 139 MMOL/L
T4 FREE SERPL-MCNC: 1.3 NG/DL
TRIGL SERPL-MCNC: 170 MG/DL
TSH SERPL-ACNC: 1.67 UIU/ML
WBC # FLD AUTO: 6.43 K/UL

## 2024-01-05 ENCOUNTER — APPOINTMENT (OUTPATIENT)
Dept: ORTHOPEDIC SURGERY | Facility: CLINIC | Age: 49
End: 2024-01-05
Payer: COMMERCIAL

## 2024-01-05 VITALS — HEIGHT: 71 IN | WEIGHT: 235 LBS | BODY MASS INDEX: 32.9 KG/M2

## 2024-01-05 PROCEDURE — 99214 OFFICE O/P EST MOD 30 MIN: CPT | Mod: 57

## 2024-01-05 NOTE — HISTORY OF PRESENT ILLNESS
[Gradual] : gradual [6] : 6 [3] : 3 [Dull/Aching] : dull/aching [Localized] : localized [Sharp] : sharp [Throbbing] : throbbing [Intermittent] : intermittent [Household chores] : household chores [Injection therapy] : injection therapy [Walking] : walking [Stairs] : stairs [de-identified] : 8/27/21: 45 YEAR OLD FEMALE PRESENTS WITH B/L KNEES FOR ABOUT A YEAR OR SO. SHE HAS TRIED MELOXICAM WITHOUT RELIEF. LEFT IS WORSE THAN THE RIGHT. PATIENT HAS MRI OF THE LEFT KNEE. PAIN BEGAN ON ITS OWN. PAIN IS WORSE WITH SITTING FOR LONG PERIODS OF TIME.  DENIES INSTABILITY, CATCHING/LOCKING 8/1/22: FOLLOW UP LEFT KNEE. PAIN IS RETURNING, LEAVING FOR [facility] IN 2 DAYS. NO MECHANICAL SYMPTOMS. GOOD RELIEF WITH PRIOR CSI 12/2021.   04/28/2023 HERE FOR A FOLLOW UP HAD CSI BACK IN AUGUST 2022 WITH SOME RELIEF, HAVING THROBBING PAIN   08/07/23 here for a follow up left knee  had csi last visit with some relief   08/18/2023: FOLLOW UP FOR MRI RESULTS OF THE L KNEE DONE ON 8/8. REPORTS MILD RELIEF W/ CSI GIVEN LAST VISIT.  01/05/2024:: here for a follow up for the L knee. States no significant changes since last visit,  [] : Post Surgical Visit: no [FreeTextEntry1] : left knee  [FreeTextEntry5] : no injury  [de-identified] : cortisone injection

## 2024-01-05 NOTE — IMAGING
[de-identified] : Mild effusion, no warmth, no ecchymosis Lateral joint line tenderness to palpation Range of motion 0-130 5/5 quadriceps and hamstring strength Positive Gregory No varus or valgus instability, negative lachman Motor and sensory intact distally Mildly antalgic gait

## 2024-01-05 NOTE — ASSESSMENT
[FreeTextEntry1] : Surgical details reviewed.  Risk benefits and alternatives to partial lateral meniscectomy and open cyst excision reviewed in detail.  She wants to set this up  at a mutually convenient jc  The risks and benefits of surgery have been discussed. Risks include but are not limited to bleeding, infection, reaction to anesthesia, injury to blood vessels and nerves, malunion, nonunion, DVT, PE, necessity of repeat surgery, chronic pain, loss of limb and death. The patient understands the risks and agrees with the surgical plan. All questions have been answered.  e.

## 2024-02-09 ENCOUNTER — APPOINTMENT (OUTPATIENT)
Dept: FAMILY MEDICINE | Facility: CLINIC | Age: 49
End: 2024-02-09
Payer: COMMERCIAL

## 2024-02-09 ENCOUNTER — NON-APPOINTMENT (OUTPATIENT)
Age: 49
End: 2024-02-09

## 2024-02-09 VITALS
BODY MASS INDEX: 35.28 KG/M2 | OXYGEN SATURATION: 97 % | WEIGHT: 252 LBS | HEART RATE: 66 BPM | HEIGHT: 71 IN | DIASTOLIC BLOOD PRESSURE: 88 MMHG | SYSTOLIC BLOOD PRESSURE: 112 MMHG

## 2024-02-09 DIAGNOSIS — M17.12 UNILATERAL PRIMARY OSTEOARTHRITIS, LEFT KNEE: ICD-10-CM

## 2024-02-09 DIAGNOSIS — Z01.818 ENCOUNTER FOR OTHER PREPROCEDURAL EXAMINATION: ICD-10-CM

## 2024-02-09 PROCEDURE — 93000 ELECTROCARDIOGRAM COMPLETE: CPT

## 2024-02-09 PROCEDURE — 36415 COLL VENOUS BLD VENIPUNCTURE: CPT

## 2024-02-09 PROCEDURE — 99214 OFFICE O/P EST MOD 30 MIN: CPT

## 2024-02-12 LAB
ANION GAP SERPL CALC-SCNC: 10 MMOL/L
BASOPHILS # BLD AUTO: 0.04 K/UL
BASOPHILS NFR BLD AUTO: 0.6 %
BUN SERPL-MCNC: 11 MG/DL
CALCIUM SERPL-MCNC: 8.9 MG/DL
CHLORIDE SERPL-SCNC: 99 MMOL/L
CO2 SERPL-SCNC: 27 MMOL/L
CREAT SERPL-MCNC: 0.9 MG/DL
EGFR: 79 ML/MIN/1.73M2
EOSINOPHIL # BLD AUTO: 0.07 K/UL
EOSINOPHIL NFR BLD AUTO: 1.1 %
GLUCOSE SERPL-MCNC: 85 MG/DL
HCG SERPL-MCNC: <1 MIU/ML
HCT VFR BLD CALC: 40.4 %
HGB BLD-MCNC: 14 G/DL
IMM GRANULOCYTES NFR BLD AUTO: 0.5 %
LYMPHOCYTES # BLD AUTO: 2.12 K/UL
LYMPHOCYTES NFR BLD AUTO: 34.3 %
MAN DIFF?: NORMAL
MCHC RBC-ENTMCNC: 33.5 PG
MCHC RBC-ENTMCNC: 34.7 GM/DL
MCV RBC AUTO: 96.7 FL
MONOCYTES # BLD AUTO: 0.74 K/UL
MONOCYTES NFR BLD AUTO: 12 %
NEUTROPHILS # BLD AUTO: 3.18 K/UL
NEUTROPHILS NFR BLD AUTO: 51.5 %
PLATELET # BLD AUTO: 245 K/UL
POTASSIUM SERPL-SCNC: 3.6 MMOL/L
RBC # BLD: 4.18 M/UL
RBC # FLD: 12.2 %
SODIUM SERPL-SCNC: 137 MMOL/L
WBC # FLD AUTO: 6.18 K/UL

## 2024-02-12 NOTE — HISTORY OF PRESENT ILLNESS
[No Pertinent Cardiac History] : no history of aortic stenosis, atrial fibrillation, coronary artery disease, recent myocardial infarction, or implantable device/pacemaker [Asthma] : asthma [No Adverse Anesthesia Reaction] : no adverse anesthesia reaction in self or family member [(Patient denies any chest pain, claudication, dyspnea on exertion, orthopnea, palpitations or syncope)] : Patient denies any chest pain, claudication, dyspnea on exertion, orthopnea, palpitations or syncope [Good (7-10 METs)] : Good (7-10 METs) [COPD] : no COPD [Sleep Apnea] : no sleep apnea [Smoker] : not a smoker [Chronic Anticoagulation] : no chronic anticoagulation [Chronic Kidney Disease] : no chronic kidney disease [Diabetes] : no diabetes [FreeTextEntry1] : left knee arthroscopic medial meniscectomy and open excision parameniscal cyst  [FreeTextEntry2] : 02/13/24 [FreeTextEntry3] : Dr Kyaw Veloz [FreeTextEntry4] : Lico is a 49 yo patient with HTN who presents to office for a medical clearance for left knee arhtroscopic partial medial meniscectomy and open excision parameniscal cyst on 2/13/24 at [facility] with Dr. Kyaw Veloz.  Her blood pressure is well-controlled on hydrochlorothiazide 12.5 mg daily and Toprol 50 mg daily. Asthma is well controlled. Has sleep study scheduled later this month, has never been previously diagnosed with CAT.

## 2024-02-12 NOTE — ASSESSMENT
[Patient Optimized for Surgery] : Patient optimized for surgery [No Further Testing Recommended] : no further testing recommended [As per surgery] : as per surgery [FreeTextEntry4] : Pending unremarkable CBC, BMP and negative HCG, pt is optimized to proceed with surgery as scheduled  HTN- well controlled on Toprol and HCTZ, pt aware to take meds with sip of water on morning of surgery

## 2024-02-12 NOTE — RESULTS/DATA
[] : results reviewed [de-identified] : sinus rhythm with non specific T wave abnormalities [de-identified] : CBC, BMP and HCG drawn today

## 2024-02-12 NOTE — ADDENDUM
[FreeTextEntry1] : 2/12/24: Preop labs reviewed, CBC, BMP unremarkable. Hcg  negative. Pt medically optimized to proceed with surgery as scheduled.

## 2024-02-13 ENCOUNTER — APPOINTMENT (OUTPATIENT)
Age: 49
End: 2024-02-13
Payer: COMMERCIAL

## 2024-02-13 PROCEDURE — 27347 REMOVE KNEE CYST: CPT | Mod: AS,59,LT

## 2024-02-13 PROCEDURE — 29881 ARTHRS KNE SRG MNISECTMY M/L: CPT | Mod: LT

## 2024-02-13 PROCEDURE — 29881 ARTHRS KNE SRG MNISECTMY M/L: CPT | Mod: AS,LT

## 2024-02-13 PROCEDURE — 27347 REMOVE KNEE CYST: CPT | Mod: 59,LT

## 2024-02-13 RX ORDER — OXYCODONE AND ACETAMINOPHEN 5; 325 MG/1; MG/1
5-325 TABLET ORAL
Qty: 10 | Refills: 0 | Status: ACTIVE | COMMUNITY
Start: 2024-02-13 | End: 1900-01-01

## 2024-02-22 ENCOUNTER — NON-APPOINTMENT (OUTPATIENT)
Age: 49
End: 2024-02-22

## 2024-02-22 ENCOUNTER — APPOINTMENT (OUTPATIENT)
Dept: ORTHOPEDIC SURGERY | Facility: CLINIC | Age: 49
End: 2024-02-22
Payer: COMMERCIAL

## 2024-02-22 VITALS — BODY MASS INDEX: 35.28 KG/M2 | WEIGHT: 252 LBS | HEIGHT: 71 IN

## 2024-02-22 PROCEDURE — 99024 POSTOP FOLLOW-UP VISIT: CPT

## 2024-02-22 NOTE — ASSESSMENT
[FreeTextEntry1] : DOING WELL DURING THIS FIRST POSTOP VISIT SUTURES REMOVED PRECAUTIONS AND RESTRICTIONS REVIEWED IN DETAIL WE REVIEWED THE INTRAOPERATIVE FINDINGS IN DETAIL (INCLUDING SCOPE PICTURES WHERE APPLICABLE) ALL QUESTIONS ANSWERED F/U 1 MONTH TO MAKE SURE INCISIONS HEALING OK

## 2024-02-22 NOTE — IMAGING
[de-identified] : No effusion or warmth Clean and dry incisions, sutures in place. Limited ROM compatible with recent surgery Negative Renetta Motor and sensory intact distally Mildly antalgic gait

## 2024-02-22 NOTE — HISTORY OF PRESENT ILLNESS
[Gradual] : gradual [6] : 6 [3] : 3 [Dull/Aching] : dull/aching [Localized] : localized [Sharp] : sharp [Throbbing] : throbbing [Intermittent] : intermittent [Household chores] : household chores [Injection therapy] : injection therapy [Walking] : walking [Stairs] : stairs [de-identified] : 8/27/21: 45 YEAR OLD FEMALE PRESENTS WITH B/L KNEES FOR ABOUT A YEAR OR SO. SHE HAS TRIED MELOXICAM WITHOUT RELIEF. LEFT IS WORSE THAN THE RIGHT. PATIENT HAS MRI OF THE LEFT KNEE. PAIN BEGAN ON ITS OWN. PAIN IS WORSE WITH SITTING FOR LONG PERIODS OF TIME.  DENIES INSTABILITY, CATCHING/LOCKING 8/1/22: FOLLOW UP LEFT KNEE. PAIN IS RETURNING, LEAVING FOR [facility] IN 2 DAYS. NO MECHANICAL SYMPTOMS. GOOD RELIEF WITH PRIOR CSI 12/2021.   04/28/2023 HERE FOR A FOLLOW UP HAD CSI BACK IN AUGUST 2022 WITH SOME RELIEF, HAVING THROBBING PAIN   08/07/23 here for a follow up left knee  had csi last visit with some relief   08/18/2023: FOLLOW UP FOR MRI RESULTS OF THE L KNEE DONE ON 8/8. REPORTS MILD RELIEF W/ CSI GIVEN LAST VISIT.  01/05/2024:: here for a follow up for the L knee. States no significant changes since last visit.  2/22/24: Here for first post op visit for the left knee - s/p left knee arthroscopy done on 2/13/4. Doing well. Using cane.  [] : Post Surgical Visit: no [FreeTextEntry5] : no injury  [FreeTextEntry1] : left knee  [de-identified] : cortisone injection

## 2024-02-29 ENCOUNTER — APPOINTMENT (OUTPATIENT)
Dept: GASTROENTEROLOGY | Facility: CLINIC | Age: 49
End: 2024-02-29

## 2024-03-08 ENCOUNTER — OUTPATIENT (OUTPATIENT)
Dept: OUTPATIENT SERVICES | Facility: HOSPITAL | Age: 49
LOS: 1 days | End: 2024-03-08
Payer: COMMERCIAL

## 2024-03-08 DIAGNOSIS — G47.33 OBSTRUCTIVE SLEEP APNEA (ADULT) (PEDIATRIC): ICD-10-CM

## 2024-03-08 PROCEDURE — 95810 POLYSOM 6/> YRS 4/> PARAM: CPT | Mod: 26

## 2024-03-08 PROCEDURE — 95810 POLYSOM 6/> YRS 4/> PARAM: CPT

## 2024-03-20 ENCOUNTER — APPOINTMENT (OUTPATIENT)
Dept: GASTROENTEROLOGY | Facility: CLINIC | Age: 49
End: 2024-03-20

## 2024-03-22 ENCOUNTER — APPOINTMENT (OUTPATIENT)
Dept: ORTHOPEDIC SURGERY | Facility: CLINIC | Age: 49
End: 2024-03-22
Payer: COMMERCIAL

## 2024-03-22 VITALS — WEIGHT: 249 LBS | HEIGHT: 71 IN | BODY MASS INDEX: 34.86 KG/M2

## 2024-03-22 DIAGNOSIS — S83.272D COMPLEX TEAR OF LATERAL MENISCUS, CURRENT INJURY, LEFT KNEE, SUBSEQUENT ENCOUNTER: ICD-10-CM

## 2024-03-22 DIAGNOSIS — M23.007 CYSTIC MENISCUS, UNSPECIFIED MENISCUS, LEFT KNEE: ICD-10-CM

## 2024-03-22 PROCEDURE — 99024 POSTOP FOLLOW-UP VISIT: CPT

## 2024-03-22 NOTE — HISTORY OF PRESENT ILLNESS
[Gradual] : gradual [6] : 6 [Dull/Aching] : dull/aching [3] : 3 [Localized] : localized [Sharp] : sharp [Throbbing] : throbbing [Intermittent] : intermittent [Injection therapy] : injection therapy [Household chores] : household chores [Walking] : walking [Stairs] : stairs [de-identified] : 8/27/21: 45 YEAR OLD FEMALE PRESENTS WITH B/L KNEES FOR ABOUT A YEAR OR SO. SHE HAS TRIED MELOXICAM WITHOUT RELIEF. LEFT IS WORSE THAN THE RIGHT. PATIENT HAS MRI OF THE LEFT KNEE. PAIN BEGAN ON ITS OWN. PAIN IS WORSE WITH SITTING FOR LONG PERIODS OF TIME.  DENIES INSTABILITY, CATCHING/LOCKING 8/1/22: FOLLOW UP LEFT KNEE. PAIN IS RETURNING, LEAVING FOR [facility] IN 2 DAYS. NO MECHANICAL SYMPTOMS. GOOD RELIEF WITH PRIOR CSI 12/2021.   04/28/2023 HERE FOR A FOLLOW UP HAD CSI BACK IN AUGUST 2022 WITH SOME RELIEF, HAVING THROBBING PAIN   08/07/23 here for a follow up left knee  had csi last visit with some relief   08/18/2023: FOLLOW UP FOR MRI RESULTS OF THE L KNEE DONE ON 8/8. REPORTS MILD RELIEF W/ CSI GIVEN LAST VISIT.  01/05/2024:: here for a follow up for the L knee. States no significant changes since last visit.  2/22/24: Here for first post op visit for the left knee - s/p left knee arthroscopy done on 2/13/4. Doing well. Using cane.   3/22/24: PO#2 left knee arthroscopy DOS 2/13/24. no longer using cane.  [] : Post Surgical Visit: no [FreeTextEntry1] : left knee  [de-identified] : cortisone injection

## 2024-03-22 NOTE — IMAGING
[de-identified] : Arthroscopy portals and lateral incision healed.  No tenderness palpation 0-130 Neg herman

## 2024-05-20 ENCOUNTER — APPOINTMENT (OUTPATIENT)
Dept: INTERNAL MEDICINE | Facility: CLINIC | Age: 49
End: 2024-05-20

## 2024-05-20 ENCOUNTER — APPOINTMENT (OUTPATIENT)
Dept: INTERNAL MEDICINE | Facility: CLINIC | Age: 49
End: 2024-05-20
Payer: COMMERCIAL

## 2024-05-20 VITALS
HEART RATE: 67 BPM | OXYGEN SATURATION: 98 % | DIASTOLIC BLOOD PRESSURE: 78 MMHG | BODY MASS INDEX: 32.9 KG/M2 | WEIGHT: 235 LBS | HEIGHT: 71 IN | SYSTOLIC BLOOD PRESSURE: 130 MMHG

## 2024-05-20 DIAGNOSIS — G47.33 OBSTRUCTIVE SLEEP APNEA (ADULT) (PEDIATRIC): ICD-10-CM

## 2024-05-20 DIAGNOSIS — I10 ESSENTIAL (PRIMARY) HYPERTENSION: ICD-10-CM

## 2024-05-20 DIAGNOSIS — R60.0 LOCALIZED EDEMA: ICD-10-CM

## 2024-05-20 PROCEDURE — 99214 OFFICE O/P EST MOD 30 MIN: CPT

## 2024-05-20 NOTE — ASSESSMENT
[FreeTextEntry1] : HTN - bp stable, continue metoprolol succinate 50mg daily and hctz 12.5mg daily, bmp stable 2/2024  Left lower extremity edema-likely lymphedema however will refer to vascular surgery for further evaluation Recommend compression socks, elevation, and decreasing salt in diet  CAT-mild as per recent sleep study, will refer to pulm for further evaluation and treatment

## 2024-05-20 NOTE — HISTORY OF PRESENT ILLNESS
[FreeTextEntry1] : follow up [de-identified] : 47 y/o female with pmhx of HTN, asthma presents for follow up. Has swelling in the left lower extremity. Worse at the end of the day. Has been going for some time now and comes and goes intermittently.  Patient previously went for ultrasound of lower extremity which was negative for DVT.  Of note patient does have a history of left knee surgery

## 2024-06-07 ENCOUNTER — NON-APPOINTMENT (OUTPATIENT)
Age: 49
End: 2024-06-07

## 2024-06-07 ENCOUNTER — APPOINTMENT (OUTPATIENT)
Dept: VASCULAR SURGERY | Facility: CLINIC | Age: 49
End: 2024-06-07
Payer: COMMERCIAL

## 2024-06-07 VITALS
SYSTOLIC BLOOD PRESSURE: 110 MMHG | BODY MASS INDEX: 35.79 KG/M2 | HEIGHT: 70 IN | DIASTOLIC BLOOD PRESSURE: 70 MMHG | OXYGEN SATURATION: 97 % | WEIGHT: 250 LBS | TEMPERATURE: 97.8 F | RESPIRATION RATE: 16 BRPM | HEART RATE: 60 BPM

## 2024-06-07 DIAGNOSIS — I89.0 LYMPHEDEMA, NOT ELSEWHERE CLASSIFIED: ICD-10-CM

## 2024-06-07 PROCEDURE — 99203 OFFICE O/P NEW LOW 30 MIN: CPT

## 2024-06-10 NOTE — ASSESSMENT
[FreeTextEntry1] : 48-year-old female with PMH HTN and asthma with evidence of familial lymphedema.  Plan -We discussed conservative therapy: compression stockings 20-30 mmHg daily from awakening until bedtime, leg elevation above the level of the heart at rest, frequent ambulation and walk daily for exercise. Script given.  -Patient would benefit from daily lymphatic pump use. Will begin acquisition through [facility] [facility] Patient presents with lymphedema extending into the truncal region secondary to familial lymph edema . Despite efforts of compression, elevation and exercise for over 4 weeks symptoms persist. Signs of hyperpigmentation noted.  Prior to appointment and during encounter with patient extensive medical records were reviewed including but not limited to, Hospital records, out patient records, laboratory data and microbiology data In addition extensive time was also spent in reviewing diagnostic studies.  Total encounter total time 30 mins >50% of time spent counseling/coordinating care

## 2024-06-10 NOTE — HISTORY OF PRESENT ILLNESS
[FreeTextEntry1] : 48-year-old female with PMH HTN and asthma presents for bilateral lower extremity swelling for over 5 years. Patient reports that since having knee surgery in February she has been experiencing more swelling of the left leg and foot. She endorses a history of lower extremity swelling especially in the warmer months. There is a positive family history of extremity swelling as well. She describes LE discomfort, heaviness and tightness from the swelling. Denies history of DVT or SVT or trauma. Pt has worn compression stockings she wears intermittently. Patient works as a  and reports spending significant time sitting. No history of hypercoagulable disorder. Denies claudication, rest pain, ulcers, chest pain and SOB.

## 2024-06-10 NOTE — PHYSICAL EXAM
[Normal Rate and Rhythm] : normal rate and rhythm [2+] : left 2+ [Ankle Swelling (On Exam)] : present [Ankle Swelling Bilaterally] : bilaterally  [Ankle Swelling On The Left] : moderate [No Rash or Lesion] : No rash or lesion [Alert] : alert [Oriented to Person] : oriented to person [Oriented to Place] : oriented to place [Oriented to Time] : oriented to time [Calm] : calm [] : not present [Varicose Veins Of Lower Extremities] : not present [Skin Ulcer] : no ulcer [de-identified] : pleasant, appears well, in no acute distress. [de-identified] : normocephalic, atraumatic [de-identified] :  supple, no masses. [de-identified] :   normal respiratory effort [FreeTextEntry1] : Bilateral nonpitting edema noted left greater than right [de-identified] : Moves all extremities [de-identified] : Healed incisional ports L knee

## 2024-08-07 ENCOUNTER — APPOINTMENT (OUTPATIENT)
Dept: PULMONOLOGY | Facility: CLINIC | Age: 49
End: 2024-08-07

## 2024-08-07 PROBLEM — J45.20 ASTHMA, MILD INTERMITTENT: Status: ACTIVE | Noted: 2024-08-07

## 2024-08-07 PROCEDURE — 99213 OFFICE O/P EST LOW 20 MIN: CPT

## 2024-08-07 NOTE — DISCUSSION/SUMMARY
[FreeTextEntry1] : IMP  Obesity Mild CAT - Severe in supine REM sleep Mild intermittent asthma  Plan  Weight loss Avoid supine sleep Truncal elevation of 30 degrees for sleep.  Avoid eating within 90 minutes of the hour of sleep. PRN albuterol No further Rx for CAT at this juncture FU if symptoms worsen.

## 2024-08-07 NOTE — PHYSICAL EXAM
[No Acute Distress] : no acute distress [Elongated Uvula] : elongated uvula [Enlarged Base of the Tongue] : enlarged base of the tongue [II] : Mallampati Class: II [Normal Appearance] : normal appearance [Neck Circumference: ___] : neck circumference: [unfilled] [No Neck Mass] : no neck mass [Normal Rate/Rhythm] : normal rate/rhythm [Normal S1, S2] : normal s1, s2 [No Resp Distress] : no resp distress [Clear to Auscultation Bilaterally] : clear to auscultation bilaterally [No Clubbing] : no clubbing [No Cyanosis] : no cyanosis [No Edema] : no edema

## 2024-08-07 NOTE — HISTORY OF PRESENT ILLNESS
[TextBox_4] : 8/7/24  48-year-old obese female Mild Snoring and mild occasional daytime fatigue LLE lymphedema HTN Thyromegaly

## 2024-08-07 NOTE — CONSULT LETTER
[Dear  ___] : Dear  [unfilled], [Consult Letter:] : I had the pleasure of evaluating your patient, [unfilled]. [Please see my note below.] : Please see my note below. [Consult Closing:] : Thank you very much for allowing me to participate in the care of this patient.  If you have any questions, please do not hesitate to contact me. [Sincerely,] : Sincerely, [DrCharles  ___] : Dr. LIVE

## 2024-11-11 ENCOUNTER — RX RENEWAL (OUTPATIENT)
Age: 49
End: 2024-11-11

## 2024-11-18 ENCOUNTER — APPOINTMENT (OUTPATIENT)
Dept: INTERNAL MEDICINE | Facility: CLINIC | Age: 49
End: 2024-11-18

## 2024-11-26 ENCOUNTER — APPOINTMENT (OUTPATIENT)
Dept: INTERNAL MEDICINE | Facility: CLINIC | Age: 49
End: 2024-11-26
Payer: COMMERCIAL

## 2024-11-26 VITALS
SYSTOLIC BLOOD PRESSURE: 129 MMHG | WEIGHT: 250 LBS | HEART RATE: 62 BPM | DIASTOLIC BLOOD PRESSURE: 85 MMHG | BODY MASS INDEX: 35.79 KG/M2 | HEIGHT: 70 IN

## 2024-11-26 DIAGNOSIS — E66.9 OBESITY, UNSPECIFIED: ICD-10-CM

## 2024-11-26 DIAGNOSIS — J45.20 MILD INTERMITTENT ASTHMA, UNCOMPLICATED: ICD-10-CM

## 2024-11-26 DIAGNOSIS — E78.5 HYPERLIPIDEMIA, UNSPECIFIED: ICD-10-CM

## 2024-11-26 DIAGNOSIS — I10 ESSENTIAL (PRIMARY) HYPERTENSION: ICD-10-CM

## 2024-11-26 DIAGNOSIS — E01.0 IODINE-DEFICIENCY RELATED DIFFUSE (ENDEMIC) GOITER: ICD-10-CM

## 2024-11-26 PROCEDURE — 36415 COLL VENOUS BLD VENIPUNCTURE: CPT

## 2024-11-26 PROCEDURE — 99214 OFFICE O/P EST MOD 30 MIN: CPT

## 2024-11-27 LAB
ALBUMIN SERPL ELPH-MCNC: 4.3 G/DL
ALP BLD-CCNC: 83 U/L
ALT SERPL-CCNC: 23 U/L
ANION GAP SERPL CALC-SCNC: 12 MMOL/L
AST SERPL-CCNC: 24 U/L
BASOPHILS # BLD AUTO: 0.04 K/UL
BASOPHILS NFR BLD AUTO: 0.8 %
BILIRUB SERPL-MCNC: 0.6 MG/DL
BUN SERPL-MCNC: 15 MG/DL
CALCIUM SERPL-MCNC: 9.3 MG/DL
CHLORIDE SERPL-SCNC: 101 MMOL/L
CHOLEST SERPL-MCNC: 206 MG/DL
CO2 SERPL-SCNC: 29 MMOL/L
CREAT SERPL-MCNC: 1.1 MG/DL
EGFR: 62 ML/MIN/1.73M2
EOSINOPHIL # BLD AUTO: 0.06 K/UL
EOSINOPHIL NFR BLD AUTO: 1.2 %
GLUCOSE SERPL-MCNC: 87 MG/DL
HCT VFR BLD CALC: 41.1 %
HDLC SERPL-MCNC: 45 MG/DL
HGB BLD-MCNC: 14 G/DL
IMM GRANULOCYTES NFR BLD AUTO: 0.2 %
LDLC SERPL CALC-MCNC: 138 MG/DL
LYMPHOCYTES # BLD AUTO: 1.67 K/UL
LYMPHOCYTES NFR BLD AUTO: 33.4 %
MAN DIFF?: NORMAL
MCHC RBC-ENTMCNC: 33 PG
MCHC RBC-ENTMCNC: 34.1 G/DL
MCV RBC AUTO: 96.9 FL
MONOCYTES # BLD AUTO: 0.49 K/UL
MONOCYTES NFR BLD AUTO: 9.8 %
NEUTROPHILS # BLD AUTO: 2.73 K/UL
NEUTROPHILS NFR BLD AUTO: 54.6 %
NONHDLC SERPL-MCNC: 161 MG/DL
PLATELET # BLD AUTO: 232 K/UL
POTASSIUM SERPL-SCNC: 3.5 MMOL/L
PROT SERPL-MCNC: 7.2 G/DL
RBC # BLD: 4.24 M/UL
RBC # FLD: 13.2 %
SODIUM SERPL-SCNC: 141 MMOL/L
T4 FREE SERPL-MCNC: 1.3 NG/DL
TRIGL SERPL-MCNC: 127 MG/DL
TSH SERPL-ACNC: 1.3 UIU/ML
WBC # FLD AUTO: 5 K/UL

## 2024-12-26 ENCOUNTER — APPOINTMENT (OUTPATIENT)
Dept: OBGYN | Facility: CLINIC | Age: 49
End: 2024-12-26

## 2025-03-18 ENCOUNTER — APPOINTMENT (OUTPATIENT)
Dept: BARIATRICS/WEIGHT MGMT | Facility: CLINIC | Age: 50
End: 2025-03-18
Payer: COMMERCIAL

## 2025-03-18 VITALS — WEIGHT: 238 LBS | HEIGHT: 71 IN | BODY MASS INDEX: 33.32 KG/M2

## 2025-03-18 DIAGNOSIS — I89.0 LYMPHEDEMA, NOT ELSEWHERE CLASSIFIED: ICD-10-CM

## 2025-03-18 DIAGNOSIS — I10 ESSENTIAL (PRIMARY) HYPERTENSION: ICD-10-CM

## 2025-03-18 DIAGNOSIS — E66.811 OBESITY, CLASS 1: ICD-10-CM

## 2025-03-18 DIAGNOSIS — E66.09 OBESITY, CLASS 1: ICD-10-CM

## 2025-03-18 DIAGNOSIS — E78.5 HYPERLIPIDEMIA, UNSPECIFIED: ICD-10-CM

## 2025-03-18 DIAGNOSIS — G47.33 OBSTRUCTIVE SLEEP APNEA (ADULT) (PEDIATRIC): ICD-10-CM

## 2025-03-18 PROCEDURE — 99204 OFFICE O/P NEW MOD 45 MIN: CPT | Mod: 95

## 2025-03-18 RX ORDER — TIRZEPATIDE 2.5 MG/.5ML
2.5 INJECTION, SOLUTION SUBCUTANEOUS
Qty: 1 | Refills: 0 | Status: ACTIVE | COMMUNITY
Start: 2025-03-18 | End: 1900-01-01

## 2025-04-15 ENCOUNTER — APPOINTMENT (OUTPATIENT)
Dept: BARIATRICS/WEIGHT MGMT | Facility: CLINIC | Age: 50
End: 2025-04-15
Payer: COMMERCIAL

## 2025-04-15 VITALS — WEIGHT: 229 LBS | BODY MASS INDEX: 32.06 KG/M2 | HEIGHT: 71 IN

## 2025-04-15 DIAGNOSIS — E66.811 OBESITY, CLASS 1: ICD-10-CM

## 2025-04-15 DIAGNOSIS — E66.09 OBESITY, CLASS 1: ICD-10-CM

## 2025-04-15 PROCEDURE — 99213 OFFICE O/P EST LOW 20 MIN: CPT | Mod: 95

## 2025-05-13 ENCOUNTER — APPOINTMENT (OUTPATIENT)
Dept: BARIATRICS/WEIGHT MGMT | Facility: CLINIC | Age: 50
End: 2025-05-13
Payer: COMMERCIAL

## 2025-05-13 VITALS — BODY MASS INDEX: 31.5 KG/M2 | HEIGHT: 71 IN | WEIGHT: 225 LBS

## 2025-05-13 DIAGNOSIS — E66.811 OBESITY, CLASS 1: ICD-10-CM

## 2025-05-13 DIAGNOSIS — E66.09 OBESITY, CLASS 1: ICD-10-CM

## 2025-05-13 DIAGNOSIS — I89.0 LYMPHEDEMA, NOT ELSEWHERE CLASSIFIED: ICD-10-CM

## 2025-05-13 DIAGNOSIS — I10 ESSENTIAL (PRIMARY) HYPERTENSION: ICD-10-CM

## 2025-05-13 PROCEDURE — 99213 OFFICE O/P EST LOW 20 MIN: CPT | Mod: 95

## 2025-06-10 ENCOUNTER — APPOINTMENT (OUTPATIENT)
Dept: BARIATRICS/WEIGHT MGMT | Facility: CLINIC | Age: 50
End: 2025-06-10
Payer: COMMERCIAL

## 2025-06-10 VITALS — WEIGHT: 223.8 LBS | HEIGHT: 71 IN | BODY MASS INDEX: 31.33 KG/M2

## 2025-06-10 PROCEDURE — 99215 OFFICE O/P EST HI 40 MIN: CPT | Mod: 95

## 2025-06-10 RX ORDER — TIRZEPATIDE 5 MG/.5ML
5 INJECTION, SOLUTION SUBCUTANEOUS
Qty: 1 | Refills: 2 | Status: ACTIVE | COMMUNITY
Start: 2025-06-10 | End: 1900-01-01

## 2025-07-08 ENCOUNTER — APPOINTMENT (OUTPATIENT)
Dept: BARIATRICS/WEIGHT MGMT | Facility: CLINIC | Age: 50
End: 2025-07-08
Payer: COMMERCIAL

## 2025-07-08 VITALS — WEIGHT: 220 LBS | HEIGHT: 71 IN | BODY MASS INDEX: 30.8 KG/M2

## 2025-07-08 PROCEDURE — 99213 OFFICE O/P EST LOW 20 MIN: CPT | Mod: 95

## 2025-08-26 ENCOUNTER — APPOINTMENT (OUTPATIENT)
Dept: BARIATRICS/WEIGHT MGMT | Facility: CLINIC | Age: 50
End: 2025-08-26
Payer: COMMERCIAL

## 2025-08-26 VITALS — BODY MASS INDEX: 30.1 KG/M2 | WEIGHT: 215 LBS | HEIGHT: 71 IN

## 2025-08-26 DIAGNOSIS — E66.09 OBESITY, CLASS 1: ICD-10-CM

## 2025-08-26 DIAGNOSIS — E66.811 OBESITY, CLASS 1: ICD-10-CM

## 2025-08-26 PROCEDURE — 99213 OFFICE O/P EST LOW 20 MIN: CPT | Mod: 95

## 2025-09-15 ENCOUNTER — NON-APPOINTMENT (OUTPATIENT)
Age: 50
End: 2025-09-15

## 2025-09-17 DIAGNOSIS — Z12.11 ENCOUNTER FOR SCREENING FOR MALIGNANT NEOPLASM OF COLON: ICD-10-CM
